# Patient Record
(demographics unavailable — no encounter records)

---

## 2025-01-16 NOTE — ASSESSMENT
[FreeTextEntry1] : follow up  hemorrhoids- no flare- would just like a refill of cortisone suppository going on a trip and wants to make sure she has it meds ordered

## 2025-01-16 NOTE — HEALTH RISK ASSESSMENT
[Monthly or less (1 pt)] : Monthly or less (1 point) [1 or 2 (0 pts)] : 1 or 2 (0 points) [Never (0 pts)] : Never (0 points) [No] : In the past 12 months have you used drugs other than those required for medical reasons? No [No falls in past year] : Patient reported no falls in the past year [0] : 2) Feeling down, depressed, or hopeless: Not at all (0) [Never] : Never [de-identified] : No [de-identified] : ENT Dr. Ray [Audit-CScore] : 1 [de-identified] : Gym Two to Three times a week  [de-identified] : Healthy

## 2025-01-16 NOTE — HISTORY OF PRESENT ILLNESS
[FreeTextEntry1] : follow up [de-identified] : follow up  hemorrhoids- no flare- would just like a refill of cortisone suppository going on a trip and wants to make sure she has it